# Patient Record
Sex: MALE | URBAN - METROPOLITAN AREA
[De-identification: names, ages, dates, MRNs, and addresses within clinical notes are randomized per-mention and may not be internally consistent; named-entity substitution may affect disease eponyms.]

---

## 2024-03-13 ENCOUNTER — OFFICE VISIT (OUTPATIENT)
Dept: PRIMARY CARE | Facility: CLINIC | Age: 19
End: 2024-03-13
Payer: COMMERCIAL

## 2024-03-13 VITALS
WEIGHT: 231 LBS | HEIGHT: 74 IN | HEART RATE: 95 BPM | SYSTOLIC BLOOD PRESSURE: 124 MMHG | DIASTOLIC BLOOD PRESSURE: 80 MMHG | BODY MASS INDEX: 29.65 KG/M2

## 2024-03-13 DIAGNOSIS — Z00.00 HEALTHCARE MAINTENANCE: Primary | ICD-10-CM

## 2024-03-13 DIAGNOSIS — J10.1 INFLUENZA A: ICD-10-CM

## 2024-03-13 PROCEDURE — 99395 PREV VISIT EST AGE 18-39: CPT | Performed by: STUDENT IN AN ORGANIZED HEALTH CARE EDUCATION/TRAINING PROGRAM

## 2024-03-13 PROCEDURE — 1036F TOBACCO NON-USER: CPT | Performed by: STUDENT IN AN ORGANIZED HEALTH CARE EDUCATION/TRAINING PROGRAM

## 2024-03-13 RX ORDER — OSELTAMIVIR PHOSPHATE 75 MG/1
75 CAPSULE ORAL 2 TIMES DAILY
COMMUNITY
Start: 2024-03-05 | End: 2024-03-13 | Stop reason: ALTCHOICE

## 2024-03-13 NOTE — PROGRESS NOTES
Subjective Subjective   Patient ID: Darci Rosales is a 19 y.o. male who presents for Annual Exam.  Today he is accompanied by alone.     HPI  1.  Healthcare maintenance  Overall patient is doing well.   Immunization: Tdap 2021; influenza vaccine deferred this year  COVID-19 up-to-date x 3  Colon Cancer Screening: No family history other than in a great grandfather  Diet: Attempts eat a better diet  Exercise: Increasing exercise more frequently  Tobacco: Denies use  EtOH: Rarely/socially  Not fasting but willing to do blood work in the near future      2.  Influenza  Recently went to the emergency room in [place] near [facility] and was diagnosed with influenza  Took Tamiflu as prescribed in the chart  Ultimately is doing much better but continues to have some slight side/symptoms  Slightly noticing some cough and some congestion but otherwise denies any difficulty breathing, chest pain, etc.  Did have a chest x-ray that showed a possible rib fracture versus artifact  Currently asymptomatic in those regards and wishes to discuss this briefly    Current Outpatient Medications on File Prior to Visit   Medication Sig Dispense Refill    oseltamivir (Tamiflu) 75 mg capsule Take 1 capsule (75 mg) by mouth twice a day.       No current facility-administered medications on file prior to visit.        No Known Allergies    Immunization History   Administered Date(s) Administered    DTaP vaccine, pediatric  (INFANRIX) 2003, 2003, 2003, 11/02/2004, 07/11/2008    Flu vaccine (IIV4), preservative free *Check age/dose* 01/07/2019, 01/06/2023    HPV 9-valent vaccine (GARDASIL 9) 06/30/2021, 07/30/2021, 04/15/2022    Hep A, Unspecified 11/12/2004, 07/13/2005    Hepatitis B vaccine, adult (RECOMBIVAX, ENGERIX) 2003, 2003, 2003    MMR vaccine, subcutaneous (MMR II) 2003, 09/30/2004, 01/04/2018    Meningococcal MCV4, Unspecified 01/07/2019    Moderna COVID-19 vaccine, bivalent, blue cap/gray  "label *Check age/dose* 10/14/2022    Moderna SARS-CoV-2 Vaccination 04/02/2021, 04/30/2021, 11/24/2021    Poliovirus vaccine, subcutaneous (IPOL) 2003, 2003, 2003, 2003, 2003, 11/02/2004, 07/11/2008    Td vaccine, age 7 years and older (TDVAX) 07/04/2013    Tdap vaccine, age 7 year and older (BOOSTRIX, ADACEL) 06/30/2021    Typhoid, ViCPs 04/11/2006    Varicella vaccine, subcutaneous (VARIVAX) 04/24/2004, 01/04/2018         Review of Systems  All pertinent positive symptoms are included in the history of present illness.  All other systems have been reviewed and are negative and noncontributory to this patient's current ailments.     Objective   /80 (BP Location: Left arm, Patient Position: Sitting, BP Cuff Size: Large adult)   Pulse 95   Ht 1.88 m (6' 2\")   Wt 105 kg (231 lb)   BMI 29.66 kg/m²   BSA: 2.34 meters squared  No visits with results within 1 Month(s) from this visit.   Latest known visit with results is:   Legacy Encounter on 04/15/2022   Component Date Value Ref Range Status    Cholesterol 04/15/2022 145  0 - 199 mg/dL Final    Comment: .      AGE      DESIRABLE   BORDERLINE HIGH   HIGH     0-19 Y     0 - 169       170 - 199     >/= 200    20-24 Y     0 - 189       190 - 224     >/= 225         >24 Y     0 - 199       200 - 239     >/= 240   **All ranges are based on fasting samples. Specific   therapeutic targets will vary based on patient-specific   cardiac risk.  .   Pediatric guidelines reference:Pediatrics 2011, 128(S5).   Adult guidelines reference: NCEP ATPIII Guidelines,     GUILLERMINA 2001, 258:2486-97  .   Venipuncture immediately after or during the    administration of Metamizole may lead to falsely   low results. Testing should be performed immediately   prior to Metamizole dosing.      HDL 04/15/2022 36.2 (A)  mg/dL Final    Comment: .      AGE      VERY LOW   LOW     NORMAL    HIGH       0-19 Y       < 35   < 40     40-45     ----    20-24 Y       ----   " < 40       >45     ----      >24 Y       ----   < 40     40-60      >60  .      Cholesterol/HDL Ratio 04/15/2022 4.0   Final    Comment: REF VALUES  DESIRABLE  < 3.4  HIGH RISK  > 5.0      LDL 04/15/2022 94  0 - 109 mg/dL Final    Comment: .                           NEAR      BORD      AGE      DESIRABLE  OPTIMAL    HIGH     HIGH     VERY HIGH     0-19 Y     0 - 109     ---    110-129   >/= 130     ----    20-24 Y     0 - 119     ---    120-159   >/= 160     ----      >24 Y     0 -  99   100-129  130-159   160-189     >/=190  .      VLDL 04/15/2022 15  0 - 40 mg/dL Final    Triglycerides 04/15/2022 76  0 - 149 mg/dL Final    Comment: .      AGE      DESIRABLE   BORDERLINE HIGH   HIGH     VERY HIGH   0 D-90 D    19 - 174         ----         ----        ----  91 D- 9 Y     0 -  74        75 -  99     >/= 100      ----    10-19 Y     0 -  89        90 - 129     >/= 130      ----    20-24 Y     0 - 114       115 - 149     >/= 150      ----         >24 Y     0 - 149       150 - 199    200- 499    >/= 500  .   Venipuncture immediately after or during the    administration of Metamizole may lead to falsely   low results. Testing should be performed immediately   prior to Metamizole dosing.      Non HDL Cholesterol 04/15/2022 109  0 - 119 mg/dL Final    Comment:     AGE      DESIRABLE   BORDERLINE HIGH   HIGH     VERY HIGH     0-19 Y     0 - 119       120 - 144     >/= 145    >/= 160    20-24 Y     0 - 149       150 - 189     >/= 190      ----         >24 Y    30 MG/DL ABOVE LDL CHOLESTEROL GOAL  .      Glucose 04/15/2022 95  74 - 99 mg/dL Final    Sodium 04/15/2022 140  136 - 145 mmol/L Final    Potassium 04/15/2022 4.7  3.5 - 5.3 mmol/L Final    Chloride 04/15/2022 108 (H)  98 - 107 mmol/L Final    Bicarbonate 04/15/2022 28  21 - 32 mmol/L Final    Anion Gap 04/15/2022 9 (L)  10 - 20 mmol/L Final    Urea Nitrogen 04/15/2022 18  6 - 23 mg/dL Final    Creatinine 04/15/2022 1.03  0.50 - 1.30 mg/dL Final    GFR MALE  04/15/2022 >90  >90 mL/min/1.73m2 Final    Comment:  CALCULATIONS OF ESTIMATED GFR ARE PERFORMED   USING THE 2021 CKD-EPI STUDY REFIT EQUATION   WITHOUT THE RACE VARIABLE FOR THE IDMS-TRACEABLE   CREATININE METHODS.    https://jasn.asnjournals.org/content/early/2021/09/22/ASN.4392329125      Calcium 04/15/2022 9.4  8.6 - 10.3 mg/dL Final    Albumin 04/15/2022 4.4  3.4 - 5.0 g/dL Final    Alkaline Phosphatase 04/15/2022 64  33 - 120 U/L Final    Total Protein 04/15/2022 7.7  6.4 - 8.2 g/dL Final    AST 04/15/2022 13  9 - 39 U/L Final    Total Bilirubin 04/15/2022 0.4  0.0 - 1.2 mg/dL Final    ALT (SGPT) 04/15/2022 18  10 - 52 U/L Final    Comment:  Patients treated with Sulfasalazine may generate    falsely decreased results for ALT.      TSH 04/15/2022 1.74  0.44 - 3.98 mIU/L Final    Comment:  TSH testing is performed using different testing    methodology at [facility] than at other    [facility]. Direct result comparisons should    only be made within the same method.      WBC 04/15/2022 5.7  4.4 - 11.3 x10E9/L Final    RBC 04/15/2022 4.99  4.50 - 5.90 x10E12/L Final    Hemoglobin 04/15/2022 14.1  13.5 - 17.5 g/dL Final    Hematocrit 04/15/2022 45.0  41.0 - 52.0 % Final    MCV 04/15/2022 90  80 - 100 fL Final    MCHC 04/15/2022 31.3 (L)  32.0 - 36.0 g/dL Final    Platelets 04/15/2022 220  150 - 450 x10E9/L Final    RDW 04/15/2022 12.6  11.5 - 14.5 % Final    Neutrophils % 04/15/2022 59.0  40.0 - 80.0 % Final    Immature Granulocytes %, Automated 04/15/2022 0.3  0.0 - 0.9 % Final    Comment:  Immature Granulocyte Count (IG) includes promyelocytes,    myelocytes and metamyelocytes but does not include bands.   Percent differential counts (%) should be interpreted in the   context of the absolute cell counts (cells/L).      Lymphocytes % 04/15/2022 27.4  13.0 - 44.0 % Final    Monocytes % 04/15/2022 9.6  2.0 - 10.0 % Final    Eosinophils % 04/15/2022 2.8  0.0 - 6.0 % Final    Basophils %  04/15/2022 0.9  0.0 - 2.0 % Final    Neutrophils Absolute 04/15/2022 3.38  1.20 - 7.70 x10E9/L Final    Lymphocytes Absolute 04/15/2022 1.57  1.20 - 4.80 x10E9/L Final    Monocytes Absolute 04/15/2022 0.55  0.10 - 1.00 x10E9/L Final    Eosinophils Absolute 04/15/2022 0.16  0.00 - 0.70 x10E9/L Final    Basophils Absolute 04/15/2022 0.05  0.00 - 0.10 x10E9/L Final    Hemoglobin A1C 04/15/2022 5.3  % Final    Comment:      Diagnosis of Diabetes-Adults   Non-Diabetic: < or = 5.6%   Increased risk for developing diabetes: 5.7-6.4%   Diagnostic of diabetes: > or = 6.5%  .       Monitoring of Diabetes                Age (y)     Therapeutic Goal (%)   Adults:          >18           <7.0   Pediatrics:    13-18           <7.5                   7-12           <8.0                   0- 6            7.5-8.5   American Diabetes Association. Diabetes Care 33(S1), Jan 2010.      Estimated Average Glucose 04/15/2022 105  MG/DL Final       Physical Exam  CONSTITUTIONAL - well nourished, well developed, looks like stated age, in no acute distress, not ill-appearing, and not tired appearing  SKIN - normal skin color and pigmentation, normal skin turgor without rash, lesions, or nodules visualized  HEAD - no trauma, normocephalic  EYES - normal external exam  ENT - TM's intact, no injection, no signs of infection, uvula midline, normal tongue movement and throat slightly erythematous, no exudate  NECK - supple without rigidity, no neck mass was observed, no thyromegaly or thyroid nodules  CHEST - clear to auscultation, no wheezing, no crackles and no rales, good effort  CARDIAC - regular rate and regular rhythm, no skipped beats, no murmur  ABDOMEN - no organomegaly, soft, nontender, nondistended, no guarding/rebound/rigidity, negative McBurney sign and negative Calvillo sign  EXTREMITIES - no edema, no deformities  NEUROLOGICAL - normal gait, normal balance, normal motor, no ataxia  PSYCHIATRIC - alert, pleasant and cordial,  age-appropriate  IMMUNOLOGIC - no cervical lymphadenopathy     Assessment/Plan   1.  Healthcare maintenance  Complete history and physical examination was performed  Fasting lab work ordered  We will notify of test results once available and make treatment recommendations accordingly  Immunizations up-to-date other than influenza vaccine which I recommend doing yearly  Please continue well-balanced diet and exercise regularly  Please follow-up on an as-needed basis and/your yearly for your physicals    2.  Influenza  Please continue monitoring signs/symptoms  If this continues over the next week, please contact the office and we can easily prescribe an antibiotic or medication at that time  An appointment is not needed

## 2024-03-15 ENCOUNTER — LAB (OUTPATIENT)
Dept: LAB | Facility: LAB | Age: 19
End: 2024-03-15
Payer: COMMERCIAL

## 2024-03-15 DIAGNOSIS — Z00.00 HEALTHCARE MAINTENANCE: ICD-10-CM

## 2024-03-15 PROCEDURE — 36415 COLL VENOUS BLD VENIPUNCTURE: CPT

## 2024-03-15 PROCEDURE — 83036 HEMOGLOBIN GLYCOSYLATED A1C: CPT

## 2024-03-16 LAB
EST. AVERAGE GLUCOSE BLD GHB EST-MCNC: 126 MG/DL
HBA1C MFR BLD: 6 %

## 2024-03-17 NOTE — RESULT ENCOUNTER NOTE
It appears the patient only performed the hemoglobin A1c from the lab work that was ordered    Hemoglobin A1c is elevated at 6.0% which does show prediabetes would recommend limiting carbohydrates and eating a well-balanced diet and exercising regularly and I would recommend repeating this within the next 6-12 months

## 2024-03-18 ENCOUNTER — TELEPHONE (OUTPATIENT)
Dept: PRIMARY CARE | Facility: CLINIC | Age: 19
End: 2024-03-18
Payer: COMMERCIAL

## 2024-03-18 NOTE — TELEPHONE ENCOUNTER
----- Message from Aurora Storey DO sent at 3/17/2024  8:15 AM EDT -----  It appears the patient only performed the hemoglobin A1c from the lab work that was ordered    Hemoglobin A1c is elevated at 6.0% which does show prediabetes would recommend limiting carbohydrates and eating a well-balanced diet and exercising regularly and I would recommend repeating this within the next 6-12 months        Spoke w/pts dad about results and informed him that the pt needs to go back to the lab for the other tests

## 2024-09-16 ENCOUNTER — APPOINTMENT (OUTPATIENT)
Dept: DERMATOLOGY | Facility: CLINIC | Age: 19
End: 2024-09-16
Payer: COMMERCIAL

## 2025-03-20 ENCOUNTER — APPOINTMENT (OUTPATIENT)
Dept: DERMATOLOGY | Facility: CLINIC | Age: 20
End: 2025-03-20
Payer: COMMERCIAL

## 2025-04-04 ENCOUNTER — APPOINTMENT (OUTPATIENT)
Dept: PRIMARY CARE | Facility: CLINIC | Age: 20
End: 2025-04-04
Payer: COMMERCIAL

## 2025-07-18 ENCOUNTER — OFFICE VISIT (OUTPATIENT)
Dept: PRIMARY CARE | Facility: CLINIC | Age: 20
End: 2025-07-18
Payer: COMMERCIAL

## 2025-07-18 VITALS
SYSTOLIC BLOOD PRESSURE: 130 MMHG | DIASTOLIC BLOOD PRESSURE: 70 MMHG | HEIGHT: 74 IN | BODY MASS INDEX: 31.32 KG/M2 | HEART RATE: 93 BPM | OXYGEN SATURATION: 99 % | WEIGHT: 244 LBS

## 2025-07-18 DIAGNOSIS — R73.03 PREDIABETES: ICD-10-CM

## 2025-07-18 DIAGNOSIS — M25.521 RIGHT ELBOW PAIN: Primary | ICD-10-CM

## 2025-07-18 DIAGNOSIS — Z13.1 SCREENING FOR DIABETES MELLITUS: ICD-10-CM

## 2025-07-18 DIAGNOSIS — Z13.29 SCREENING FOR THYROID DISORDER: ICD-10-CM

## 2025-07-18 DIAGNOSIS — Z13.0 SCREENING FOR BLOOD DISEASE: ICD-10-CM

## 2025-07-18 DIAGNOSIS — Z13.6 SCREENING FOR CARDIOVASCULAR CONDITION: ICD-10-CM

## 2025-07-18 DIAGNOSIS — Z00.00 HEALTHCARE MAINTENANCE: ICD-10-CM

## 2025-07-18 PROCEDURE — 99395 PREV VISIT EST AGE 18-39: CPT

## 2025-07-18 PROCEDURE — 99213 OFFICE O/P EST LOW 20 MIN: CPT

## 2025-07-18 PROCEDURE — 3008F BODY MASS INDEX DOCD: CPT

## 2025-07-18 PROCEDURE — 1036F TOBACCO NON-USER: CPT

## 2025-07-18 ASSESSMENT — PATIENT HEALTH QUESTIONNAIRE - PHQ9
SUM OF ALL RESPONSES TO PHQ9 QUESTIONS 1 AND 2: 0
1. LITTLE INTEREST OR PLEASURE IN DOING THINGS: NOT AT ALL
2. FEELING DOWN, DEPRESSED OR HOPELESS: NOT AT ALL

## 2025-07-18 NOTE — PROGRESS NOTES
Subjective Subjective   Patient ID: Darci Rosales is a 20 y.o. male who presents for Annual Exam.  Today he is accompanied by alone.     HPI  1.  Healthcare maintenance  Overall patient is doing well.   Immunization: Tdap 2021; influenza vaccine deferred this year  COVID-19 up-to-date x 3  Colon Cancer Screening: No family history other than in a great grandfather  Diet: Attempts eat a better diet, eats mostly protein  Exercise: previously 1-2x a week, but not regular within the past 6 months  Tobacco: Denies use  EtOH: Denies  Not fasting but willing to do blood work in the near future    2.  Hand/elbow Pain, right side  Going for 4-5 months, worse when he works out or puts pressure on it in a bending position, or flexing it  Has minimally improved over the past few months  Worse when working out.    3.  Prediabetes  Last A1c at 6.0%      No current outpatient medications on file prior to visit.     No current facility-administered medications on file prior to visit.        No Known Allergies    Immunization History   Administered Date(s) Administered    DTaP vaccine, pediatric  (INFANRIX) 2003, 2003, 2003, 11/02/2004, 07/11/2008    Flu vaccine (IIV4), preservative free *Check age/dose* 01/07/2019, 01/06/2023    HPV 9-valent vaccine (GARDASIL 9) 06/30/2021, 07/30/2021, 04/15/2022    Hep A, Unspecified 11/12/2004, 07/13/2005    Hepatitis B vaccine, adult *Check Product/Dose* 2003, 2003, 2003    MMR vaccine, subcutaneous (MMR II) 2003, 09/30/2004, 01/04/2018    Meningococcal MCV4, Unspecified 01/07/2019    Moderna COVID-19 vaccine, bivalent, blue cap/gray label *Check age/dose* 10/14/2022    Moderna SARS-CoV-2 Vaccination 04/02/2021, 04/30/2021, 11/24/2021    Poliovirus vaccine, subcutaneous (IPOL) 2003, 2003, 2003, 2003, 2003, 11/02/2004, 07/11/2008    Td vaccine, age 7 years and older (TDVAX) 07/04/2013    Tdap vaccine, age 7 year and older  "(BOOSTRIX, ADACEL) 06/30/2021    Typhoid, ViCPs 04/11/2006    Varicella vaccine, subcutaneous (VARIVAX) 04/24/2004, 01/04/2018         Review of Systems  All pertinent positive symptoms are included in the history of present illness.  All other systems have been reviewed and are negative and noncontributory to this patient's current ailments.     Objective   /70 (BP Location: Left arm, Patient Position: Sitting, BP Cuff Size: Large adult)   Pulse 93   Ht 1.88 m (6' 2\")   Wt 111 kg (244 lb)   SpO2 99%   BMI 31.33 kg/m²   BSA: 2.41 meters squared  No visits with results within 1 Month(s) from this visit.   Latest known visit with results is:   Lab on 03/15/2024   Component Date Value Ref Range Status    Hemoglobin A1C 03/15/2024 6.0 (H)  see below % Final    Estimated Average Glucose 03/15/2024 126  Not Established mg/dL Final       Physical Exam  CONSTITUTIONAL - well nourished, well developed, looks like stated age, in no acute distress, not ill-appearing, and not tired appearing  SKIN - normal skin color and pigmentation, normal skin turgor without rash, lesions, or nodules visualized  HEAD - no trauma, normocephalic  EYES - pupils are equal and reactive to light, extraocular muscles are intact, and normal external exam  ENT - no injection, no signs of infection, uvula midline, normal tongue movement and throat normal, no exudate, nasal passage without discharge and patent  NECK - supple without rigidity, no neck mass was observed,   LUNG - clear to auscultation, no wheezing, no crackles and no rales, good effort  CARDIAC - regular rate and regular rhythm, no skipped beats, no murmur  ABDOMEN - no organomegaly, soft, nontender, nondistended, normal bowel sounds  EXTREMITIES - no edema, no deformities, tender right epicondyle  NEUROLOGICAL - normal gait, normal balance, normal motor, alert, oriented and no focal signs  PSYCHIATRIC - alert, pleasant and cordial, age-appropriate      Assessment/Plan   1.  " Healthcare maintenance  Complete history and physical examination was performed  Fasting lab work ordered  We will notify of test results once available and make treatment recommendations accordingly  Please continue well-balanced diet and exercise regularly  Please follow-up on an as-needed basis and/your yearly for your physicals    2.  Epicondylitis   A requisition for an X ray has been placed.  In addition we have also ordered physical therapy for you to help with your symptoms.    3.  Prediabetes  Blood work has been ordered for you to get done we will notify results and recommendations accordingly.    We will follow up in 1 year or sooner if needed.

## 2025-07-18 NOTE — PROGRESS NOTES
I reviewed the resident's documentation and discussed the patient with the resident. I agree with the resident's medical decision making as documented in the note.    Anuradha Richards DO, MBA  [facility]

## 2025-08-13 ENCOUNTER — APPOINTMENT (OUTPATIENT)
Dept: DERMATOLOGY | Facility: CLINIC | Age: 20
End: 2025-08-13
Payer: COMMERCIAL

## 2025-08-30 LAB
ALBUMIN SERPL-MCNC: 4.7 G/DL (ref 3.6–5.1)
ALP SERPL-CCNC: 54 U/L (ref 36–130)
ALT SERPL-CCNC: 25 U/L (ref 9–46)
ANION GAP SERPL CALCULATED.4IONS-SCNC: 9 MMOL/L (CALC) (ref 7–17)
AST SERPL-CCNC: 16 U/L (ref 10–40)
BASOPHILS # BLD AUTO: 30 CELLS/UL (ref 0–200)
BASOPHILS NFR BLD AUTO: 0.6 %
BILIRUB SERPL-MCNC: 0.7 MG/DL (ref 0.2–1.2)
BUN SERPL-MCNC: 11 MG/DL (ref 7–25)
CALCIUM SERPL-MCNC: 10.1 MG/DL (ref 8.6–10.3)
CHLORIDE SERPL-SCNC: 105 MMOL/L (ref 98–110)
CHOLEST SERPL-MCNC: 164 MG/DL
CHOLEST/HDLC SERPL: 5 (CALC)
CO2 SERPL-SCNC: 28 MMOL/L (ref 20–32)
CREAT SERPL-MCNC: 1.1 MG/DL (ref 0.6–1.24)
EGFRCR SERPLBLD CKD-EPI 2021: 97 ML/MIN/1.73M2
EOSINOPHIL # BLD AUTO: 100 CELLS/UL (ref 15–500)
EOSINOPHIL NFR BLD AUTO: 2 %
ERYTHROCYTE [DISTWIDTH] IN BLOOD BY AUTOMATED COUNT: 12 % (ref 11–15)
EST. AVERAGE GLUCOSE BLD GHB EST-MCNC: 111 MG/DL
EST. AVERAGE GLUCOSE BLD GHB EST-SCNC: 6.2 MMOL/L
GLUCOSE SERPL-MCNC: 91 MG/DL (ref 65–99)
HBA1C MFR BLD: 5.5 %
HCT VFR BLD AUTO: 42.3 % (ref 38.5–50)
HDLC SERPL-MCNC: 33 MG/DL
HGB BLD-MCNC: 13.9 G/DL (ref 13.2–17.1)
LDLC SERPL CALC-MCNC: 112 MG/DL (CALC)
LYMPHOCYTES # BLD AUTO: 1185 CELLS/UL (ref 850–3900)
LYMPHOCYTES NFR BLD AUTO: 23.7 %
MCH RBC QN AUTO: 29.3 PG (ref 27–33)
MCHC RBC AUTO-ENTMCNC: 32.9 G/DL (ref 32–36)
MCV RBC AUTO: 89.1 FL (ref 80–100)
MONOCYTES # BLD AUTO: 420 CELLS/UL (ref 200–950)
MONOCYTES NFR BLD AUTO: 8.4 %
NEUTROPHILS # BLD AUTO: 3265 CELLS/UL (ref 1500–7800)
NEUTROPHILS NFR BLD AUTO: 65.3 %
NONHDLC SERPL-MCNC: 131 MG/DL (CALC)
PLATELET # BLD AUTO: 229 THOUSAND/UL (ref 140–400)
PMV BLD REES-ECKER: 10.8 FL (ref 7.5–12.5)
POTASSIUM SERPL-SCNC: 4.9 MMOL/L (ref 3.5–5.3)
PROT SERPL-MCNC: 7.3 G/DL (ref 6.1–8.1)
RBC # BLD AUTO: 4.75 MILLION/UL (ref 4.2–5.8)
SODIUM SERPL-SCNC: 142 MMOL/L (ref 135–146)
TRIGL SERPL-MCNC: 93 MG/DL
TSH SERPL-ACNC: 0.87 MIU/L (ref 0.4–4.5)
WBC # BLD AUTO: 5 THOUSAND/UL (ref 3.8–10.8)